# Patient Record
Sex: FEMALE | Race: WHITE
[De-identification: names, ages, dates, MRNs, and addresses within clinical notes are randomized per-mention and may not be internally consistent; named-entity substitution may affect disease eponyms.]

---

## 2017-10-18 ENCOUNTER — HOSPITAL ENCOUNTER (EMERGENCY)
Dept: HOSPITAL 26 - MED | Age: 67
Discharge: HOME | End: 2017-10-18
Payer: COMMERCIAL

## 2017-10-18 VITALS — HEIGHT: 62 IN | WEIGHT: 247 LBS | BODY MASS INDEX: 45.45 KG/M2

## 2017-10-18 VITALS — SYSTOLIC BLOOD PRESSURE: 122 MMHG | DIASTOLIC BLOOD PRESSURE: 65 MMHG

## 2017-10-18 VITALS — DIASTOLIC BLOOD PRESSURE: 85 MMHG | SYSTOLIC BLOOD PRESSURE: 164 MMHG

## 2017-10-18 DIAGNOSIS — Z90.49: ICD-10-CM
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
## 2017-10-18 DIAGNOSIS — R07.89: ICD-10-CM

## 2017-10-18 DIAGNOSIS — S13.4XXA: Primary | ICD-10-CM
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
## 2017-10-18 DIAGNOSIS — I10: ICD-10-CM

## 2017-10-18 NOTE — NUR
Patient discharged with v/s stable. Written and verbal after care instructions 
given and explained. 

Patient alert, oriented and verbalized understanding of instructions. 
Ambulatory with steady gait. All questions addressed prior to discharge. ID 
band removed. Patient advised to follow up with PMD. Rx of NORCO & MOTRIN 
given. Patient educated on indication of medication including possible reaction 
and side effects. Opportunity to ask questions provided and answered.

## 2017-10-18 NOTE — NUR
67 /F BIBA FROM FIELD FOR SEAT BELT PAIN TO FRONT OF CHEST AWAKE AND ALERT AND 
ABLE TO AMBULATE TO LOBBY.PT IS PASSENGER. DENIES LOC. PT C/O CHEST PAIN. 
DENIES N/V/D; SKIN IS PINK/WARM/DRY; AAOX4 WITH EVEN AND STEADY GAIT; LUNGS 
CLEAR BL;  PT DENIES ANY FEVER, CP, SOB, OR COUGH AT THIS TIME; PATIENT STATES 
PAIN OF 5/10 AT THIS TIME;  PATIENT POSITIONED FOR COMFORT; HOB ELEVATED; 
BEDRAILS UP X2; BED DOWN. ER MD MADE AWARE OF PT STATUS.

## 2023-09-29 ENCOUNTER — HOSPITAL ENCOUNTER (OUTPATIENT)
Dept: HOSPITAL 26 - MDS | Age: 73
Discharge: HOME | End: 2023-09-29
Attending: INTERNAL MEDICINE
Payer: COMMERCIAL

## 2023-09-29 VITALS — WEIGHT: 220 LBS | HEIGHT: 61 IN | BODY MASS INDEX: 41.54 KG/M2

## 2023-09-29 DIAGNOSIS — K64.9: ICD-10-CM

## 2023-09-29 DIAGNOSIS — Z12.11: Primary | ICD-10-CM

## 2023-09-29 DIAGNOSIS — Z79.899: ICD-10-CM

## 2023-09-29 DIAGNOSIS — I10: ICD-10-CM

## 2023-09-29 DIAGNOSIS — Z90.49: ICD-10-CM

## 2023-09-29 DIAGNOSIS — K57.30: ICD-10-CM

## 2023-09-29 DIAGNOSIS — E11.9: ICD-10-CM

## 2023-09-29 PROCEDURE — 45378 DIAGNOSTIC COLONOSCOPY: CPT
